# Patient Record
Sex: FEMALE | Race: BLACK OR AFRICAN AMERICAN | Employment: UNEMPLOYED | URBAN - METROPOLITAN AREA
[De-identification: names, ages, dates, MRNs, and addresses within clinical notes are randomized per-mention and may not be internally consistent; named-entity substitution may affect disease eponyms.]

---

## 2022-03-24 ENCOUNTER — OFFICE VISIT (OUTPATIENT)
Dept: NEUROLOGY | Age: 62
End: 2022-03-24
Payer: MEDICAID

## 2022-03-24 VITALS
SYSTOLIC BLOOD PRESSURE: 170 MMHG | RESPIRATION RATE: 22 BRPM | DIASTOLIC BLOOD PRESSURE: 73 MMHG | HEART RATE: 86 BPM | BODY MASS INDEX: 46.23 KG/M2 | TEMPERATURE: 98.1 F | OXYGEN SATURATION: 97 % | WEIGHT: 261 LBS

## 2022-03-24 DIAGNOSIS — R55 SYNCOPE AND COLLAPSE: Primary | ICD-10-CM

## 2022-03-24 DIAGNOSIS — R56.9 SEIZURE-LIKE ACTIVITY (HCC): ICD-10-CM

## 2022-03-24 PROCEDURE — 99204 OFFICE O/P NEW MOD 45 MIN: CPT | Performed by: PSYCHIATRY & NEUROLOGY

## 2022-03-24 RX ORDER — INSULIN GLARGINE 100 [IU]/ML
INJECTION, SOLUTION SUBCUTANEOUS
COMMUNITY
Start: 2021-11-08

## 2022-03-24 RX ORDER — MONTELUKAST SODIUM 10 MG/1
TABLET ORAL
COMMUNITY

## 2022-03-24 RX ORDER — MINERAL OIL
ENEMA (ML) RECTAL
COMMUNITY

## 2022-03-24 RX ORDER — ATORVASTATIN CALCIUM 20 MG/1
20 TABLET, FILM COATED ORAL
COMMUNITY
Start: 2021-11-08

## 2022-03-24 RX ORDER — HYDROXYZINE 25 MG/1
25 TABLET, FILM COATED ORAL
COMMUNITY

## 2022-03-24 RX ORDER — GABAPENTIN 100 MG/1
1 CAPSULE ORAL DAILY
COMMUNITY
Start: 2021-12-28

## 2022-03-24 RX ORDER — PANTOPRAZOLE SODIUM 40 MG/1
40 TABLET, DELAYED RELEASE ORAL DAILY
COMMUNITY

## 2022-03-24 RX ORDER — FUROSEMIDE 20 MG/1
TABLET ORAL
COMMUNITY
Start: 2021-12-28

## 2022-03-24 RX ORDER — GLIPIZIDE 10 MG/1
TABLET ORAL
COMMUNITY

## 2022-03-24 RX ORDER — AMLODIPINE BESYLATE 10 MG/1
TABLET ORAL
COMMUNITY
Start: 2021-11-08

## 2022-03-24 RX ORDER — CARVEDILOL 6.25 MG/1
TABLET ORAL
COMMUNITY

## 2022-03-24 RX ORDER — ONDANSETRON 4 MG/1
TABLET, ORALLY DISINTEGRATING ORAL
COMMUNITY

## 2022-03-24 RX ORDER — IBUPROFEN 200 MG
TABLET ORAL
COMMUNITY
Start: 2022-02-15

## 2022-03-24 RX ORDER — FERROUS SULFATE TAB 325 MG (65 MG ELEMENTAL FE) 325 (65 FE) MG
TAB ORAL
COMMUNITY
Start: 2022-03-10

## 2022-03-24 RX ORDER — METFORMIN HYDROCHLORIDE 500 MG/1
500 TABLET ORAL 2 TIMES DAILY WITH MEALS
COMMUNITY

## 2022-03-24 NOTE — PROGRESS NOTES
[facility] and [address] at [facility] at [address] [place] [address], [id number]  [phone] Office  [phone] Facsimile           Referring: Leonor Ayoub MD      Chief Complaint   Patient presents with    New Patient    Loss of Consciousness     does see cardiology     72-year-old lady presents today for initial neurologic consultation regarding recurrent loss of consciousness. She notes this started probably a year ago. No inciting factor. She has no idea when it occurs. It happens more more frequently and she believes her symptoms are worsening. She is now having at least 2 episodes per month. She gets no warning. She loses consciousness for 30 to 40 seconds. She can be sitting in a chair talking to someone when she goes unconscious. She is not responsive. She does not weigh herself. She will also awaken in the floor. She does recount that she was in the store with her son's girlfriend and the next thing she knows the girlfriend is open off the floor. He often described she was having hand automatisms and appeared to be biting her tongue. She has seen cardiology. She has had a patch monitor that was said to be normal.  She had echocardiogram cardiac testing that was said to be normal.  She has not seen a neurologist for this. She did have bad headaches about 30 years ago and follow-up with neurology. She has diabetes and has gastroparesis. 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       Past Medical History:   Diagnosis Date    COPD (chronic obstructive pulmonary disease) ([address]     Diabetes mellitus type 2 in obese ([address]     Gastric paresis     Hypertension      Past Surgical History:   Procedure Laterality Date    HX GI  2017    gastric pacemaker     Current Outpatient Medications on File Prior to Visit Medication Sig Dispense Refill    gabapentin (NEURONTIN) 100 mg capsule Take 1 Capsule by mouth daily.  furosemide (LASIX) 20 mg tablet   See Instructions, TAKE 1 TABLET BY MOUTH DAILY AS DIRECTED, # 30 tab, 4 Refill(s), Pharmacy: [address] [place] [address]      FeroSuL 325 mg (65 mg iron) tablet       carvediloL (COREG) 6.25 mg tablet 1 tablet with food      atorvastatin (LIPITOR) 20 mg tablet Take 20 mg by mouth.  amLODIPine (NORVASC) 10 mg tablet Take  by mouth.  fexofenadine (ALLEGRA) 180 mg tablet 1 tablet as needed      glipiZIDE (GLUCOTROL) 10 mg tablet 1 tablet 30 minutes before breakfast      hydrOXYzine HCL (ATARAX) 25 mg tablet Take 25 mg by mouth three (3) times daily as needed.  insulin glargine (Lantus U-100 Insulin) 100 unit/mL injection   See Instructions, INJECT 60 UNITS AT BEDTIME, # 10 mL, 5 Refill(s), Pharmacy: [address] [place] [address]      linaCLOtide (Linzess) 290 mcg cap capsule 1 capsule at least 30 minutes before the first meal of the day on an empty stomach      metFORMIN (GLUCOPHAGE) 500 mg tablet Take 500 mg by mouth two (2) times daily (with meals).  montelukast (SINGULAIR) 10 mg tablet 1 tablet      nicotine (NICODERM CQ) 14 mg/24 hr patch       ondansetron (ZOFRAN ODT) 4 mg disintegrating tablet 1 tablet on the tongue and allow to dissolve      pantoprazole (PROTONIX) 40 mg tablet Take 40 mg by mouth daily. No current facility-administered medications on file prior to visit.           Allergies   Allergen Reactions    Ace Inhibitors Anaphylaxis, Swelling and Angioedema     Historical allergy noted in [place]  Historical allergy noted in [place]         Social History     Tobacco Use    Smoking status: Current Some Day Smoker    Smokeless tobacco: Never Used   Vaping Use    Vaping Use: Never used   Substance Use Topics    Alcohol use: Not Currently    Drug use: Not Currently     Family History   Problem Relation Age of Onset    Dementia Mother    Ilan Torres Leukemia Father        Review of Systems  Pertinent positives and negatives as noted. Examination  Visit Vitals  BP (!) 179/78 (BP 1 Location: Left upper arm, BP Patient Position: Sitting, BP Cuff Size: Adult)   Pulse 86   Temp 98.1 °F (36.7 °C)   Resp 22   Wt 118.4 kg (261 lb)   SpO2 97%   BMI 46.23 kg/m²     Neurologically, she is awake, alert, and oriented with normal speech and language. Her cognition is normal.    Intact cranial nerves 2-12. No nystagmus. Disk margins are not well seen. She has normal bulk and tone. She has no abnormal movement. She has no pronation or drift. She generates full strength in the upper and lower extremities to direct confrontational testing. Reflexes are symmetrical in the upper and lower extremities bilaterally. No pathologic reflexes are elicited. Finger nose finger and rapid alternating movements are normal.  Steady gait. Impression/Plan  81-year-old lady with episodes of recurrent loss of consciousness question syncope versus seizure and the events as described are in keeping with seizure-like activity and certainly with her diabetes and her gastroparesis autonomic nervous system dysfunction also high on the differential  I would like to get an MRI of the brain but she has a gastric pacemaker so we will get CT of the head  Routine and sleep deprived EEGs  Carotid Doppler  Formal autonomic nervous system testing  Follow-up after    Levy Scherer MD          This note was created using voice recognition software. Despite editing, there may be syntax errors.

## 2022-03-24 NOTE — PATIENT INSTRUCTIONS
RESULT POLICY      If we have ordered testing for you, know that; \"NO NEWS IS GOOD NEWS! \"     It is our policy that we no longer call patients with results, nor do we  give test results over the phone. We schedule follow up appointments so that your results can be discussed in person. This allows you to address any questions you have regarding the results. If you choose to go to an imaging center outside of [facility], it is your responsibility to bring imaging report and disc to follow up appointment. If something of concern is revealed on your test, we will contact you to discuss the matter and if needed schedule a sooner follow up appointment. Additionally, results may be found by using the My Chart feature and one of our patient service representatives at the  can give you instructions on how to access this feature to utilize our electronic medical record system. Thank you for your understanding. [address] [facility]   Statement to Patients  April 1, 2014      In an effort to ensure the large volume of patient prescription refills is processed in the most efficient and expeditious manner, we are asking our patients to assist us by calling your Pharmacy for all prescription refills, this will include also your  Mail Order Pharmacy. The pharmacy will contact our office electronically to continue the refill process. Please do not wait until the last minute to call your pharmacy. We need at least 48 hours (2days) to fill prescriptions. We also encourage you to call your pharmacy before going to  your prescription to make sure it is ready. With regard to controlled substance prescription refill requests (narcotic refills) that need to be picked up at our office, we ask your cooperation by providing us with at least 72 hours (3days) notice that you will need a refill.     We will not refill narcotic prescription refill requests after 4:00pm on any weekday, Monday through Thursday, or after 2:00pm on Fridays, or on the weekends. We encourage everyone to explore another way of getting your prescription refill request processed using [employer], our patient web portal through our electronic medical record system. [employer] is an efficient and effective way to communicate your medication request directly to the office and  downloadable as an sienna on your smart phone . [employer] also features a review functionality that allows you to view your medication list as well as leave messages for your physician. Are you ready to get connected? If so please review the attatched instructions or speak to any of our staff to get you set up right away! Thank you so much for your cooperation.  Should you have any questions please contact our Practice Administrator.'

## 2022-04-26 ENCOUNTER — OFFICE VISIT (OUTPATIENT)
Dept: NEUROLOGY | Age: 62
End: 2022-04-26

## 2022-04-26 ENCOUNTER — HOSPITAL ENCOUNTER (OUTPATIENT)
Dept: CT IMAGING | Age: 62
Discharge: HOME | End: 2022-04-26
Attending: PSYCHIATRY & NEUROLOGY
Payer: MEDICAID

## 2022-04-26 DIAGNOSIS — R55 SYNCOPE AND COLLAPSE: ICD-10-CM

## 2022-04-26 DIAGNOSIS — R56.9 SEIZURE-LIKE ACTIVITY (HCC): ICD-10-CM

## 2022-04-26 DIAGNOSIS — R55 SYNCOPE AND COLLAPSE: Primary | ICD-10-CM

## 2022-04-26 PROCEDURE — 70450 CT HEAD/BRAIN W/O DYE: CPT

## 2022-05-02 NOTE — PROCEDURES
EEG:      Date:  04/26/22    Requesting Physician:  Harjit Rodríguez MD     An EEG is requested in this 64 YO lady to evaluate for epileptiform abnormality. Medications:  Medications are said to include Neurontin, Lasix, Lipitor, Norvasc, Allegra, Atarax, Protonix, Singulair. This tracing is obtained during the awake, drowsy and sleeping states. During wakefulness there are brief intermittent runs of posteriorly dominant and symmetric low to medium amplitude 9 cycle per second activities, which attenuate with eye opening. Lower voltage, faster frequency activities are seen symmetrically over the anterior head regions. Hyperventilation is not performed. Intermittent photic stimulation little alters the tracing. Stage 2 sleep is attained. Interpretation:   This EEG recorded during the awake, drowsy and sleeping states is normal.